# Patient Record
(demographics unavailable — no encounter records)

---

## 2024-11-15 NOTE — DISCUSSION/SUMMARY
[FreeTextEntry1] : rto for routine care  patient given referral for speech and behavior problem [] : The components of the vaccine(s) to be administered today are listed in the plan of care. The disease(s) for which the vaccine(s) are intended to prevent and the risks have been discussed with the caretaker.  The risks are also included in the appropriate vaccination information statements which have been provided to the patient's caregiver.  The caregiver has given consent to vaccinate.

## 2025-03-10 NOTE — HISTORY OF PRESENT ILLNESS
[Parents] : parents [Fruit] : fruit [Vegetables] : vegetables [Meat] : meat [Grains] : grains [Eggs] : eggs [Fish] : fish [Dairy] : dairy [Normal] : Normal [___ stools per day] : [unfilled]  stools per day [___ voids per day] : [unfilled] voids per day [In crib] : In crib [Brushing teeth] : Brushing teeth [Yes] : Patient goes to dentist yearly [Toothpaste] : Primary Fluoride Source: Toothpaste [Appropiate parent-child communication] : Appropriate parent-child communication [No] : Not at  exposure [Water heater temperature set at <120 degrees F] : Water heater temperature set at <120 degrees F [Car seat in back seat] : Car seat in back seat [Smoke Detectors] : Smoke detectors [Supervised play near cars and streets] : Supervised play near cars and streets [Carbon Monoxide Detectors] : Carbon monoxide detectors [Exposure to electronic nicotine delivery system] : No exposure to electronic nicotine delivery system [Up to date] : Up to date [FreeTextEntry1] :  3 year female brought to the office for Well . Has been doing well, appetite is good, sleeps well, voiding and stooling normally. Growth and development is appropriate for age except for behavior. She is easily distracted and has "meltdowns" when she doesn't get her way. Attends a school and they are concern with some sensory

## 2025-03-10 NOTE — DEVELOPMENTAL MILESTONES
[Yes: _______] : yes, [unfilled] [Goes to the bathroom and urinates] : does not go to bathroom and urinates by self [Plays and shares with others] : does not play and share with others [Put on coat, jacket, or shirt by self] : puts on coat, jacket, or shirt by self [Begins to play make-believe] : begins to play make-believe [Eats independently] : eats independently [Uses 3-word sentences] : uses 3-word sentences [Uses words that are 75% intelligible] : does not use words that are 75% intelligible to strangers [Understands simple prepositions] : understands simple prepositions [Tells a story from a book or TV] : tells a story from a book or TV [Compares things using words such] : compares things using words such as bigger or shorter [Pedals tricycle] : does not pedal tricycle [Climbs on and off couch] : climbs on and off couch or chair [Jumps forward] : jumps forward [Draws a single Beaver] : draws a single Beaver [Draws a person with head] : does not draw a person with head and one other body part [Cuts with child scissor] : cuts with child scissor

## 2025-03-10 NOTE — DISCUSSION/SUMMARY
[FreeTextEntry1] :   Three year old female with some sensory and behavioral issues. Agree with CPSE evaluation. Continue balanced diet with all food groups. Brush teeth twice a day with toothbrush. Recommend visit to dentist. As per car seat 's guidelines, use foward-facing car seat in back seat of car. Switch to booster seat when child reaches highest weight/height for seat. Put toddler to sleep in own bed. Help toddler to maintain consistent daily routines and sleep schedule. Pre-K discussed. Ensure home is safe. Use consistent, positive discipline. Read aloud to toddler. Limit screen time to no more than 2 hours per day. Return for well child check in 1 year.